# Patient Record
Sex: FEMALE | Race: OTHER
[De-identification: names, ages, dates, MRNs, and addresses within clinical notes are randomized per-mention and may not be internally consistent; named-entity substitution may affect disease eponyms.]

---

## 2017-09-05 ENCOUNTER — HOSPITAL ENCOUNTER (EMERGENCY)
Dept: HOSPITAL 15 - ER | Age: 27
LOS: 1 days | Discharge: LEFT BEFORE DISCHARGE | End: 2017-09-06
Payer: MEDICAID

## 2017-09-05 VITALS — BODY MASS INDEX: 22.08 KG/M2 | HEIGHT: 62 IN | WEIGHT: 120 LBS

## 2017-09-05 VITALS — DIASTOLIC BLOOD PRESSURE: 88 MMHG | SYSTOLIC BLOOD PRESSURE: 132 MMHG
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
## 2017-09-05 DIAGNOSIS — M25.572: Primary | ICD-10-CM
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
## 2017-09-05 DIAGNOSIS — Z53.21: ICD-10-CM

## 2017-09-05 PROCEDURE — 81025 URINE PREGNANCY TEST: CPT

## 2019-06-17 ENCOUNTER — HOSPITAL ENCOUNTER (INPATIENT)
Dept: HOSPITAL 15 - ER | Age: 29
LOS: 7 days | Discharge: HOME | DRG: 425 | End: 2019-06-24
Attending: INTERNAL MEDICINE | Admitting: INTERNAL MEDICINE
Payer: MEDICAID

## 2019-06-17 VITALS — WEIGHT: 115.3 LBS | BODY MASS INDEX: 19.21 KG/M2 | HEIGHT: 65 IN

## 2019-06-17 VITALS — SYSTOLIC BLOOD PRESSURE: 101 MMHG | DIASTOLIC BLOOD PRESSURE: 61 MMHG

## 2019-06-17 VITALS — DIASTOLIC BLOOD PRESSURE: 61 MMHG | SYSTOLIC BLOOD PRESSURE: 101 MMHG

## 2019-06-17 DIAGNOSIS — R07.89: ICD-10-CM

## 2019-06-17 DIAGNOSIS — E87.6: ICD-10-CM

## 2019-06-17 DIAGNOSIS — E89.0: ICD-10-CM

## 2019-06-17 DIAGNOSIS — Z83.3: ICD-10-CM

## 2019-06-17 DIAGNOSIS — E83.51: Primary | ICD-10-CM

## 2019-06-17 DIAGNOSIS — E11.649: ICD-10-CM

## 2019-06-17 DIAGNOSIS — Z79.899: ICD-10-CM

## 2019-06-17 DIAGNOSIS — E83.39: ICD-10-CM

## 2019-06-17 LAB
ALBUMIN SERPL-MCNC: 4 G/DL (ref 3.4–5)
ALP SERPL-CCNC: 127 U/L (ref 45–117)
ALT SERPL-CCNC: 23 U/L (ref 13–56)
ANION GAP SERPL CALCULATED.3IONS-SCNC: 14 MMOL/L (ref 5–15)
BILIRUB SERPL-MCNC: 0.5 MG/DL (ref 0.2–1)
BUN SERPL-MCNC: 12 MG/DL (ref 7–18)
BUN/CREAT SERPL: 14.5
CALCIUM SERPL-MCNC: 5.9 MG/DL (ref 8.5–10.1)
CHLORIDE SERPL-SCNC: 103 MMOL/L (ref 98–107)
CO2 SERPL-SCNC: 23 MMOL/L (ref 21–32)
GLUCOSE SERPL-MCNC: 78 MG/DL (ref 74–106)
HCT VFR BLD AUTO: 36.1 % (ref 36–46)
HGB BLD-MCNC: 11.6 G/DL (ref 12.2–16.2)
MCH RBC QN AUTO: 24.9 PG (ref 28–32)
MCV RBC AUTO: 77.7 FL (ref 80–100)
NRBC BLD QL AUTO: 0.1 %
POTASSIUM SERPL-SCNC: 3.6 MMOL/L (ref 3.5–5.1)
PROT SERPL-MCNC: 7.5 G/DL (ref 6.4–8.2)
SODIUM SERPL-SCNC: 140 MMOL/L (ref 136–145)

## 2019-06-17 PROCEDURE — 93005 ELECTROCARDIOGRAM TRACING: CPT

## 2019-06-17 PROCEDURE — 82310 ASSAY OF CALCIUM: CPT

## 2019-06-17 PROCEDURE — 96361 HYDRATE IV INFUSION ADD-ON: CPT

## 2019-06-17 PROCEDURE — 87081 CULTURE SCREEN ONLY: CPT

## 2019-06-17 PROCEDURE — 84702 CHORIONIC GONADOTROPIN TEST: CPT

## 2019-06-17 PROCEDURE — 82330 ASSAY OF CALCIUM: CPT

## 2019-06-17 PROCEDURE — 96365 THER/PROPH/DIAG IV INF INIT: CPT

## 2019-06-17 PROCEDURE — 83735 ASSAY OF MAGNESIUM: CPT

## 2019-06-17 PROCEDURE — 84484 ASSAY OF TROPONIN QUANT: CPT

## 2019-06-17 PROCEDURE — 85025 COMPLETE CBC W/AUTO DIFF WBC: CPT

## 2019-06-17 PROCEDURE — 80048 BASIC METABOLIC PNL TOTAL CA: CPT

## 2019-06-17 PROCEDURE — 82306 VITAMIN D 25 HYDROXY: CPT

## 2019-06-17 PROCEDURE — 96375 TX/PRO/DX INJ NEW DRUG ADDON: CPT

## 2019-06-17 PROCEDURE — 84439 ASSAY OF FREE THYROXINE: CPT

## 2019-06-17 PROCEDURE — 80053 COMPREHEN METABOLIC PANEL: CPT

## 2019-06-17 PROCEDURE — 84100 ASSAY OF PHOSPHORUS: CPT

## 2019-06-17 PROCEDURE — 36415 COLL VENOUS BLD VENIPUNCTURE: CPT

## 2019-06-17 PROCEDURE — 84443 ASSAY THYROID STIM HORMONE: CPT

## 2019-06-17 PROCEDURE — 83970 ASSAY OF PARATHORMONE: CPT

## 2019-06-17 PROCEDURE — 93306 TTE W/DOPPLER COMPLETE: CPT

## 2019-06-17 RX ADMIN — CALCIUM ACETATE SCH MG: 667 CAPSULE ORAL at 18:58

## 2019-06-17 RX ADMIN — CALCIUM ACETATE SCH MG: 667 CAPSULE ORAL at 22:06

## 2019-06-18 VITALS — SYSTOLIC BLOOD PRESSURE: 103 MMHG | DIASTOLIC BLOOD PRESSURE: 70 MMHG

## 2019-06-18 VITALS — SYSTOLIC BLOOD PRESSURE: 100 MMHG | DIASTOLIC BLOOD PRESSURE: 52 MMHG

## 2019-06-18 VITALS — SYSTOLIC BLOOD PRESSURE: 97 MMHG | DIASTOLIC BLOOD PRESSURE: 59 MMHG

## 2019-06-18 VITALS — DIASTOLIC BLOOD PRESSURE: 64 MMHG | SYSTOLIC BLOOD PRESSURE: 94 MMHG

## 2019-06-18 VITALS — DIASTOLIC BLOOD PRESSURE: 65 MMHG | SYSTOLIC BLOOD PRESSURE: 93 MMHG

## 2019-06-18 LAB
ANION GAP SERPL CALCULATED.3IONS-SCNC: 12 MMOL/L (ref 5–15)
ANION GAP SERPL CALCULATED.3IONS-SCNC: 12 MMOL/L (ref 5–15)
BUN SERPL-MCNC: 12 MG/DL (ref 7–18)
BUN SERPL-MCNC: 9 MG/DL (ref 7–18)
BUN/CREAT SERPL: 11.4
BUN/CREAT SERPL: 13.3
CALCIUM SERPL-MCNC: 6.1 MG/DL (ref 8.5–10.1)
CALCIUM SERPL-MCNC: 6.2 MG/DL (ref 8.5–10.1)
CHLORIDE SERPL-SCNC: 103 MMOL/L (ref 98–107)
CHLORIDE SERPL-SCNC: 104 MMOL/L (ref 98–107)
CO2 SERPL-SCNC: 24 MMOL/L (ref 21–32)
CO2 SERPL-SCNC: 26 MMOL/L (ref 21–32)
GLUCOSE SERPL-MCNC: 87 MG/DL (ref 74–106)
GLUCOSE SERPL-MCNC: 90 MG/DL (ref 74–106)
POTASSIUM SERPL-SCNC: 3.6 MMOL/L (ref 3.5–5.1)
POTASSIUM SERPL-SCNC: 3.6 MMOL/L (ref 3.5–5.1)
SODIUM SERPL-SCNC: 140 MMOL/L (ref 136–145)
SODIUM SERPL-SCNC: 141 MMOL/L (ref 136–145)

## 2019-06-18 RX ADMIN — HYDROCODONE BITARTRATE AND ACETAMINOPHEN PRN TAB: 5; 325 TABLET ORAL at 12:12

## 2019-06-18 RX ADMIN — CALCIUM ACETATE SCH MG: 667 CAPSULE ORAL at 09:54

## 2019-06-18 RX ADMIN — Medication SCH MG: at 22:06

## 2019-06-18 RX ADMIN — Medication SCH MG: at 16:02

## 2019-06-18 RX ADMIN — NITROGLYCERIN PRN MG: 0.4 TABLET SUBLINGUAL at 22:05

## 2019-06-18 RX ADMIN — LEVOTHYROXINE SODIUM SCH MCG: 50 TABLET ORAL at 05:50

## 2019-06-18 RX ADMIN — ONDANSETRON HYDROCHLORIDE PRN MG: 2 INJECTION, SOLUTION INTRAMUSCULAR; INTRAVENOUS at 22:30

## 2019-06-18 RX ADMIN — MAGNESIUM OXIDE TAB 400 MG (241.3 MG ELEMENTAL MG) SCH MG: 400 (241.3 MG) TAB at 09:53

## 2019-06-18 RX ADMIN — NITROGLYCERIN PRN MG: 0.4 TABLET SUBLINGUAL at 22:15

## 2019-06-18 RX ADMIN — NITROGLYCERIN PRN MG: 0.4 TABLET SUBLINGUAL at 21:55

## 2019-06-18 NOTE — NUR
Hospitalist Wally called back and updated on patient's status. Relayed result of Calcium, 
received new order to give 2 gm Calcium Gluconate IV x1, acknowledged and read back, will 
carry out order

## 2019-06-18 NOTE — NUR
Morning note



patient resting in bed with eyes closed, respirations even and unlabored, no distress noted. 
Fall precautions in place with call light within reach. Will continue to monitor q1hr & PRN.

## 2019-06-18 NOTE — NUR
Spoke with hospitalist - Dr. Burr



updated MD. MD verbalized understanding. Orders received and read back to verify.

## 2019-06-18 NOTE — NUR
Spoke to Dr. Burr and updated on patient's status. Received new order at this time, read 
back and acknowledged, will carry out order

## 2019-06-18 NOTE — NUR
Telemetry admit from [facility] admitted to Telemetry unit after SBAR received.  Patient oriented to Jory Reyes RN primary RN, unit, room, bed, and unit policies regarding patient care and 
visiting hours. Patient now on continuous telemetry monitoring, tele box # 30 and telemetry 
reading on arrival to unit is SR. Patient weighed by bedscale and encouraged to call if they 
need something. All questions and concerns addressed, patient verbalized understanding, will 
continue to monitor 

Note: []

## 2019-06-18 NOTE — NUR
Patient complained of chest pain and nausea and continuous tingling sensation on both arms. 
V/S taken and EKG done. Paged Dr. Burr, awaiting call back

## 2019-06-18 NOTE — NUR
closing note



Patient resting in bed with even and unlabored respirations, no distress noted. Visitor at 
bedside.

## 2019-06-18 NOTE — NUR
MD was at bedside - Dr. Ramana REYES to place orders. Patient to follow up with MD outpatient. Patient aware.

## 2019-06-18 NOTE — NUR
Patient is complaining of tingling sensation on both arms. Patient and family are 
complaining about the Calcium dose, and that she's not getting the same dose of 8000mg a 
day. Will refer to MD

## 2019-06-18 NOTE — NUR
Patient complained of tingling sensation that is coming back again on her right arm. Paged 
hospitalist by CLIFF Schroeder, awaiting call back

## 2019-06-18 NOTE — NUR
Spoke to Dr. Burr and updated on patient's status and latest lab results. Received new 
order to give 1 bag of Calcium Gluconate x1 at this time, acknowledged and read back, will 
continue care

## 2019-06-19 VITALS — SYSTOLIC BLOOD PRESSURE: 113 MMHG | DIASTOLIC BLOOD PRESSURE: 65 MMHG

## 2019-06-19 VITALS — DIASTOLIC BLOOD PRESSURE: 69 MMHG | SYSTOLIC BLOOD PRESSURE: 103 MMHG

## 2019-06-19 VITALS — SYSTOLIC BLOOD PRESSURE: 98 MMHG | DIASTOLIC BLOOD PRESSURE: 61 MMHG

## 2019-06-19 VITALS — DIASTOLIC BLOOD PRESSURE: 81 MMHG | SYSTOLIC BLOOD PRESSURE: 106 MMHG

## 2019-06-19 VITALS — SYSTOLIC BLOOD PRESSURE: 97 MMHG | DIASTOLIC BLOOD PRESSURE: 66 MMHG

## 2019-06-19 VITALS — SYSTOLIC BLOOD PRESSURE: 106 MMHG | DIASTOLIC BLOOD PRESSURE: 81 MMHG

## 2019-06-19 VITALS — DIASTOLIC BLOOD PRESSURE: 68 MMHG | SYSTOLIC BLOOD PRESSURE: 94 MMHG

## 2019-06-19 VITALS — DIASTOLIC BLOOD PRESSURE: 68 MMHG | SYSTOLIC BLOOD PRESSURE: 108 MMHG

## 2019-06-19 VITALS — SYSTOLIC BLOOD PRESSURE: 112 MMHG | DIASTOLIC BLOOD PRESSURE: 66 MMHG

## 2019-06-19 LAB
ALBUMIN SERPL-MCNC: 3.5 G/DL (ref 3.4–5)
ALP SERPL-CCNC: 113 U/L (ref 45–117)
ALT SERPL-CCNC: 20 U/L (ref 13–56)
ANION GAP SERPL CALCULATED.3IONS-SCNC: 9 MMOL/L (ref 5–15)
BILIRUB SERPL-MCNC: 0.3 MG/DL (ref 0.2–1)
BUN SERPL-MCNC: 15 MG/DL (ref 7–18)
BUN/CREAT SERPL: 17.9
CALCIUM SERPL-MCNC: 6.1 MG/DL (ref 8.5–10.1)
CHLORIDE SERPL-SCNC: 105 MMOL/L (ref 98–107)
CO2 SERPL-SCNC: 26 MMOL/L (ref 21–32)
GLUCOSE SERPL-MCNC: 84 MG/DL (ref 74–106)
MAGNESIUM SERPL-MCNC: 2 MG/DL (ref 1.6–2.6)
POTASSIUM SERPL-SCNC: 3.7 MMOL/L (ref 3.5–5.1)
PROT SERPL-MCNC: 7.1 G/DL (ref 6.4–8.2)
SODIUM SERPL-SCNC: 140 MMOL/L (ref 136–145)

## 2019-06-19 RX ADMIN — Medication SCH MG: at 16:05

## 2019-06-19 RX ADMIN — NITROGLYCERIN PRN MG: 0.4 TABLET SUBLINGUAL at 00:55

## 2019-06-19 RX ADMIN — CALCIUM GLUCONATE SCH MLS/HR: 94 INJECTION, SOLUTION INTRAVENOUS at 21:00

## 2019-06-19 RX ADMIN — CALCIUM GLUCONATE SCH MLS/HR: 94 INJECTION, SOLUTION INTRAVENOUS at 22:48

## 2019-06-19 RX ADMIN — MAGNESIUM OXIDE TAB 400 MG (241.3 MG ELEMENTAL MG) SCH MG: 400 (241.3 MG) TAB at 10:44

## 2019-06-19 RX ADMIN — CALCITRIOL SCH MCG: 0.25 CAPSULE, LIQUID FILLED ORAL at 22:17

## 2019-06-19 RX ADMIN — Medication SCH MG: at 19:42

## 2019-06-19 RX ADMIN — ONDANSETRON HYDROCHLORIDE PRN MG: 2 INJECTION, SOLUTION INTRAMUSCULAR; INTRAVENOUS at 16:33

## 2019-06-19 RX ADMIN — CALCIUM GLUCONATE SCH MLS/HR: 94 INJECTION, SOLUTION INTRAVENOUS at 19:43

## 2019-06-19 RX ADMIN — LEVOTHYROXINE SODIUM SCH MCG: 50 TABLET ORAL at 06:13

## 2019-06-19 NOTE — NUR
Received orders from DR. Boles for repeat calcium glucanate x2. Will place orders, 
administer and continue to monitor.

## 2019-06-19 NOTE — NUR
RECEIVED PATIENT

Received patient into room 103 patient transferred to ICU bed and connected to bedside 
monitor. Patient alert and oriented X 4, speech appropriate, with right hand clenched and 
unable to squeeze hand. Left hand  strong. Sinus rhythm in the 70's on bedside monitor, 
pulses palpable on upper/lower extremities with no edema observed. Patient on room air 
sating in the high 90's. Abdomen soft, nontender, and non distended with bowel sounds 
present. IV to the right forearm 22g: good blood return and flushes easily infusing Calcium 
gluconate per MD orders. Skin intact. Bed at lowest position and call light within reach. 
Will continue to monitor patient closely.

## 2019-06-19 NOTE — NUR
NUTRITION

PT STATES SHE HAS HAD VERY POOR APPETITE PAST COUPLE OF DAYS. PT  BROUGHT FOOD 
PREFERENCE TO PT. PT CONSUMED 75% OF MEAL WITH NO COMPLAINTS OF NAUSEA.

## 2019-06-19 NOTE — NUR
ACTIVITY

PT AMBULATED WITH ASSIST TO TOILET. PT STATES ABLE TO URINATE WITH NO DIFFICULTY. PT 
ASSISTED BACK TO BED. RECONNECTED TO MONITORING. CALL BELL IN REACH. PT  AT BEDSIDE.

## 2019-06-19 NOTE — NUR
Charge, Maki CORONADO, notified of the patient's recurring tetany. Maki to call Dr. Burr 
for orders for possible transfer to ICU for calcium drip

## 2019-06-19 NOTE — NUR
OPEN

ASSUMED CARE OF FEMALE PT A&O X 4. PT SB MID 50'S TO SR 60'S ON CARDIAC MONITOR. PT ON ROOM 
AIR SATS 97%. PT NOW HAS FULL ROM AND GOOD HAND  ROBERTA. DENIES ANY NUMBNESS OR TINGLING. 
PT CURRENTLY RECEIVING CA GLUCONATE IVP INFUSING INTO 22 G IV TO R. F/A B&P. PT AMBULATES TO 
BATHROOM WITH STANDBY ASSIST. PT DENIES PAIN. BED IN LOWEST LOCKED POSITION. SIDE RAILS UP X 
2. PT EDUCATED TO USE CALL BELL PRIOR TO AMBULATION. WILL CONTINUE TO MONITOR.

## 2019-06-20 VITALS — DIASTOLIC BLOOD PRESSURE: 51 MMHG | SYSTOLIC BLOOD PRESSURE: 84 MMHG

## 2019-06-20 VITALS — SYSTOLIC BLOOD PRESSURE: 94 MMHG | DIASTOLIC BLOOD PRESSURE: 56 MMHG

## 2019-06-20 VITALS — SYSTOLIC BLOOD PRESSURE: 87 MMHG | DIASTOLIC BLOOD PRESSURE: 53 MMHG

## 2019-06-20 VITALS — SYSTOLIC BLOOD PRESSURE: 80 MMHG | DIASTOLIC BLOOD PRESSURE: 44 MMHG

## 2019-06-20 VITALS — DIASTOLIC BLOOD PRESSURE: 61 MMHG | SYSTOLIC BLOOD PRESSURE: 96 MMHG

## 2019-06-20 VITALS — SYSTOLIC BLOOD PRESSURE: 99 MMHG | DIASTOLIC BLOOD PRESSURE: 53 MMHG

## 2019-06-20 VITALS — DIASTOLIC BLOOD PRESSURE: 62 MMHG | SYSTOLIC BLOOD PRESSURE: 98 MMHG

## 2019-06-20 VITALS — DIASTOLIC BLOOD PRESSURE: 59 MMHG | SYSTOLIC BLOOD PRESSURE: 104 MMHG

## 2019-06-20 VITALS — SYSTOLIC BLOOD PRESSURE: 111 MMHG | DIASTOLIC BLOOD PRESSURE: 60 MMHG

## 2019-06-20 VITALS — SYSTOLIC BLOOD PRESSURE: 92 MMHG | DIASTOLIC BLOOD PRESSURE: 65 MMHG

## 2019-06-20 VITALS — DIASTOLIC BLOOD PRESSURE: 44 MMHG | SYSTOLIC BLOOD PRESSURE: 99 MMHG

## 2019-06-20 VITALS — SYSTOLIC BLOOD PRESSURE: 97 MMHG | DIASTOLIC BLOOD PRESSURE: 51 MMHG

## 2019-06-20 VITALS — SYSTOLIC BLOOD PRESSURE: 82 MMHG | DIASTOLIC BLOOD PRESSURE: 45 MMHG

## 2019-06-20 VITALS — DIASTOLIC BLOOD PRESSURE: 65 MMHG | SYSTOLIC BLOOD PRESSURE: 108 MMHG

## 2019-06-20 VITALS — SYSTOLIC BLOOD PRESSURE: 92 MMHG | DIASTOLIC BLOOD PRESSURE: 48 MMHG

## 2019-06-20 VITALS — SYSTOLIC BLOOD PRESSURE: 107 MMHG | DIASTOLIC BLOOD PRESSURE: 53 MMHG

## 2019-06-20 VITALS — SYSTOLIC BLOOD PRESSURE: 110 MMHG | DIASTOLIC BLOOD PRESSURE: 70 MMHG

## 2019-06-20 VITALS — DIASTOLIC BLOOD PRESSURE: 71 MMHG | SYSTOLIC BLOOD PRESSURE: 118 MMHG

## 2019-06-20 VITALS — DIASTOLIC BLOOD PRESSURE: 61 MMHG | SYSTOLIC BLOOD PRESSURE: 105 MMHG

## 2019-06-20 VITALS — DIASTOLIC BLOOD PRESSURE: 61 MMHG | SYSTOLIC BLOOD PRESSURE: 93 MMHG

## 2019-06-20 VITALS — SYSTOLIC BLOOD PRESSURE: 104 MMHG | DIASTOLIC BLOOD PRESSURE: 71 MMHG

## 2019-06-20 VITALS — SYSTOLIC BLOOD PRESSURE: 103 MMHG | DIASTOLIC BLOOD PRESSURE: 66 MMHG

## 2019-06-20 VITALS — DIASTOLIC BLOOD PRESSURE: 71 MMHG | SYSTOLIC BLOOD PRESSURE: 101 MMHG

## 2019-06-20 LAB
ALBUMIN SERPL-MCNC: 3.6 G/DL (ref 3.4–5)
ALBUMIN SERPL-MCNC: 3.7 G/DL (ref 3.4–5)
ALP SERPL-CCNC: 122 U/L (ref 45–117)
ALP SERPL-CCNC: 124 U/L (ref 45–117)
ALT SERPL-CCNC: 20 U/L (ref 13–56)
ALT SERPL-CCNC: 21 U/L (ref 13–56)
ANION GAP SERPL CALCULATED.3IONS-SCNC: 8 MMOL/L (ref 5–15)
ANION GAP SERPL CALCULATED.3IONS-SCNC: 8 MMOL/L (ref 5–15)
ANION GAP SERPL CALCULATED.3IONS-SCNC: 9 MMOL/L (ref 5–15)
BILIRUB SERPL-MCNC: 0.4 MG/DL (ref 0.2–1)
BILIRUB SERPL-MCNC: 0.5 MG/DL (ref 0.2–1)
BUN SERPL-MCNC: 10 MG/DL (ref 7–18)
BUN SERPL-MCNC: 11 MG/DL (ref 7–18)
BUN SERPL-MCNC: 11 MG/DL (ref 7–18)
BUN/CREAT SERPL: 12.1
BUN/CREAT SERPL: 12.3
BUN/CREAT SERPL: 12.9
CALCIUM SERPL-MCNC: 6.9 MG/DL (ref 8.5–10.1)
CALCIUM SERPL-MCNC: 7.2 MG/DL (ref 8.5–10.1)
CALCIUM SERPL-MCNC: 8.6 MG/DL (ref 8.5–10.1)
CHLORIDE SERPL-SCNC: 101 MMOL/L (ref 98–107)
CHLORIDE SERPL-SCNC: 102 MMOL/L (ref 98–107)
CHLORIDE SERPL-SCNC: 103 MMOL/L (ref 98–107)
CO2 SERPL-SCNC: 26 MMOL/L (ref 21–32)
CO2 SERPL-SCNC: 27 MMOL/L (ref 21–32)
CO2 SERPL-SCNC: 29 MMOL/L (ref 21–32)
GLUCOSE SERPL-MCNC: 105 MG/DL (ref 74–106)
GLUCOSE SERPL-MCNC: 91 MG/DL (ref 74–106)
GLUCOSE SERPL-MCNC: 93 MG/DL (ref 74–106)
POTASSIUM SERPL-SCNC: 3.7 MMOL/L (ref 3.5–5.1)
POTASSIUM SERPL-SCNC: 3.7 MMOL/L (ref 3.5–5.1)
POTASSIUM SERPL-SCNC: 4.1 MMOL/L (ref 3.5–5.1)
PROT SERPL-MCNC: 7.3 G/DL (ref 6.4–8.2)
PROT SERPL-MCNC: 7.4 G/DL (ref 6.4–8.2)
SODIUM SERPL-SCNC: 136 MMOL/L (ref 136–145)
SODIUM SERPL-SCNC: 138 MMOL/L (ref 136–145)
SODIUM SERPL-SCNC: 139 MMOL/L (ref 136–145)

## 2019-06-20 RX ADMIN — HYDROCODONE BITARTRATE AND ACETAMINOPHEN PRN TAB: 5; 325 TABLET ORAL at 20:01

## 2019-06-20 RX ADMIN — CALCITRIOL SCH MCG: 0.25 CAPSULE, LIQUID FILLED ORAL at 10:18

## 2019-06-20 RX ADMIN — MAGNESIUM OXIDE TAB 400 MG (241.3 MG ELEMENTAL MG) SCH MG: 400 (241.3 MG) TAB at 10:18

## 2019-06-20 RX ADMIN — CALCITRIOL SCH MCG: 0.25 CAPSULE, LIQUID FILLED ORAL at 21:52

## 2019-06-20 RX ADMIN — LEVOTHYROXINE SODIUM SCH MCG: 50 TABLET ORAL at 05:54

## 2019-06-20 RX ADMIN — CALCIUM ACETATE SCH MG: 667 CAPSULE ORAL at 18:08

## 2019-06-20 RX ADMIN — Medication SCH MG: at 08:25

## 2019-06-20 RX ADMIN — CALCIUM GLUCONATE SCH MLS/HR: 94 INJECTION, SOLUTION INTRAVENOUS at 00:35

## 2019-06-20 NOTE — NUR
MD Dr. Boles at bedside updated on patient condition with no new orders received. MD spoke to 
patient and patient family, whom are at bedside, questions/concerns answered.

## 2019-06-20 NOTE — NUR
MD Dr. Burr at bedside updated on patient condition with no new orders received. MD spoke to 
patient regarding plan of care and questions/concerns answered by MD.Will continue to 
monitor patient at this time.

## 2019-06-20 NOTE — NUR
Patient bathe/linen change

Patient assisted with sponge/basin bath. Skin integrity assessed for any changes, no new 
changes. Mouth care done by self, slightly gun bleed after brushing, Pt stated that she 
brushed slightly hard. Partial linens changed. Patient ambulated to the toilet near the bed 
by self, w/o incident. Continue care.

## 2019-06-20 NOTE — NUR
DR EAGLE CALL

DR EAGLE UPDATED ON CURRENT CA+ LEVEL, AND PHOSPHORUS LEVEL. UPDATED REGARDING PT CONDITION 
THROUGH THE NIGHT. ORDERS RECEIVED/READ BACK AND VERIFIED.

## 2019-06-20 NOTE — NUR
IV REMOVAL/IV START

PT IV TO R. F.A. TENDER PINK TO AREA. NS 10ML/HR WAS INFUSING. D/C'D CATH INTACT. NEW IV 
START TO L. F/A 22 G AFTER 1 ATTEMPT ESTABLISHED. PT TOLERATED WELL.

## 2019-06-20 NOTE — NUR
MD

Spoke to Dr. Boles  aware of Calcium level of 7.2 received new orders, this RN to input 
into system. Will carry out orders.

## 2019-06-20 NOTE — NUR
MD Dr. Boles paged at [phone] spoke with Radha and states " Will page doctor." 
Awaiting for MD to call back.

## 2019-06-20 NOTE — NUR
MD Dr. Boles paged for a second time at [phone] spoke with Judith and states " Will get a 
hold of doctor." Awaiting for MD to call back.

## 2019-06-21 VITALS — SYSTOLIC BLOOD PRESSURE: 91 MMHG | DIASTOLIC BLOOD PRESSURE: 47 MMHG

## 2019-06-21 VITALS — SYSTOLIC BLOOD PRESSURE: 135 MMHG | DIASTOLIC BLOOD PRESSURE: 95 MMHG

## 2019-06-21 VITALS — SYSTOLIC BLOOD PRESSURE: 98 MMHG | DIASTOLIC BLOOD PRESSURE: 49 MMHG

## 2019-06-21 VITALS — SYSTOLIC BLOOD PRESSURE: 93 MMHG | DIASTOLIC BLOOD PRESSURE: 59 MMHG

## 2019-06-21 VITALS — SYSTOLIC BLOOD PRESSURE: 114 MMHG | DIASTOLIC BLOOD PRESSURE: 67 MMHG

## 2019-06-21 VITALS — SYSTOLIC BLOOD PRESSURE: 105 MMHG | DIASTOLIC BLOOD PRESSURE: 57 MMHG

## 2019-06-21 VITALS — DIASTOLIC BLOOD PRESSURE: 57 MMHG | SYSTOLIC BLOOD PRESSURE: 105 MMHG

## 2019-06-21 VITALS — DIASTOLIC BLOOD PRESSURE: 56 MMHG | SYSTOLIC BLOOD PRESSURE: 94 MMHG

## 2019-06-21 VITALS — SYSTOLIC BLOOD PRESSURE: 115 MMHG | DIASTOLIC BLOOD PRESSURE: 58 MMHG

## 2019-06-21 VITALS — SYSTOLIC BLOOD PRESSURE: 108 MMHG | DIASTOLIC BLOOD PRESSURE: 64 MMHG

## 2019-06-21 VITALS — DIASTOLIC BLOOD PRESSURE: 52 MMHG | SYSTOLIC BLOOD PRESSURE: 97 MMHG

## 2019-06-21 VITALS — DIASTOLIC BLOOD PRESSURE: 95 MMHG | SYSTOLIC BLOOD PRESSURE: 135 MMHG

## 2019-06-21 VITALS — SYSTOLIC BLOOD PRESSURE: 95 MMHG | DIASTOLIC BLOOD PRESSURE: 61 MMHG

## 2019-06-21 VITALS — DIASTOLIC BLOOD PRESSURE: 53 MMHG | SYSTOLIC BLOOD PRESSURE: 124 MMHG

## 2019-06-21 VITALS — SYSTOLIC BLOOD PRESSURE: 105 MMHG | DIASTOLIC BLOOD PRESSURE: 77 MMHG

## 2019-06-21 VITALS — SYSTOLIC BLOOD PRESSURE: 105 MMHG | DIASTOLIC BLOOD PRESSURE: 66 MMHG

## 2019-06-21 VITALS — SYSTOLIC BLOOD PRESSURE: 99 MMHG | DIASTOLIC BLOOD PRESSURE: 47 MMHG

## 2019-06-21 VITALS — DIASTOLIC BLOOD PRESSURE: 61 MMHG | SYSTOLIC BLOOD PRESSURE: 97 MMHG

## 2019-06-21 VITALS — SYSTOLIC BLOOD PRESSURE: 108 MMHG | DIASTOLIC BLOOD PRESSURE: 67 MMHG

## 2019-06-21 VITALS — SYSTOLIC BLOOD PRESSURE: 96 MMHG | DIASTOLIC BLOOD PRESSURE: 56 MMHG

## 2019-06-21 VITALS — DIASTOLIC BLOOD PRESSURE: 61 MMHG | SYSTOLIC BLOOD PRESSURE: 94 MMHG

## 2019-06-21 VITALS — DIASTOLIC BLOOD PRESSURE: 62 MMHG | SYSTOLIC BLOOD PRESSURE: 94 MMHG

## 2019-06-21 VITALS — DIASTOLIC BLOOD PRESSURE: 58 MMHG | SYSTOLIC BLOOD PRESSURE: 96 MMHG

## 2019-06-21 VITALS — DIASTOLIC BLOOD PRESSURE: 67 MMHG | SYSTOLIC BLOOD PRESSURE: 108 MMHG

## 2019-06-21 VITALS — DIASTOLIC BLOOD PRESSURE: 53 MMHG | SYSTOLIC BLOOD PRESSURE: 92 MMHG

## 2019-06-21 LAB
ALBUMIN SERPL-MCNC: 3.6 G/DL (ref 3.4–5)
ALP SERPL-CCNC: 117 U/L (ref 45–117)
ALT SERPL-CCNC: 18 U/L (ref 13–56)
ANION GAP SERPL CALCULATED.3IONS-SCNC: 7 MMOL/L (ref 5–15)
BILIRUB SERPL-MCNC: 0.3 MG/DL (ref 0.2–1)
BUN SERPL-MCNC: 14 MG/DL (ref 7–18)
BUN/CREAT SERPL: 17.7
CALCIUM SERPL-MCNC: 6.9 MG/DL (ref 8.5–10.1)
CHLORIDE SERPL-SCNC: 105 MMOL/L (ref 98–107)
CO2 SERPL-SCNC: 27 MMOL/L (ref 21–32)
GLUCOSE SERPL-MCNC: 92 MG/DL (ref 74–106)
HCT VFR BLD AUTO: 33.8 % (ref 36–46)
HGB BLD-MCNC: 11.4 G/DL (ref 12.2–16.2)
MAGNESIUM SERPL-MCNC: 2 MG/DL (ref 1.6–2.6)
MCH RBC QN AUTO: 26.3 PG (ref 28–32)
MCV RBC AUTO: 77.8 FL (ref 80–100)
NRBC BLD QL AUTO: 0.4 %
POTASSIUM SERPL-SCNC: 3.8 MMOL/L (ref 3.5–5.1)
PROT SERPL-MCNC: 7 G/DL (ref 6.4–8.2)
SODIUM SERPL-SCNC: 139 MMOL/L (ref 136–145)

## 2019-06-21 RX ADMIN — LEVOTHYROXINE SODIUM SCH MCG: 50 TABLET ORAL at 06:19

## 2019-06-21 RX ADMIN — CALCIUM ACETATE SCH MG: 667 CAPSULE ORAL at 12:10

## 2019-06-21 RX ADMIN — CALCIUM ACETATE SCH MG: 667 CAPSULE ORAL at 18:26

## 2019-06-21 RX ADMIN — CALCIUM GLUCONATE SCH MLS/HR: 94 INJECTION, SOLUTION INTRAVENOUS at 12:49

## 2019-06-21 RX ADMIN — CALCIUM GLUCONATE SCH MLS/HR: 94 INJECTION, SOLUTION INTRAVENOUS at 10:20

## 2019-06-21 RX ADMIN — CALCITRIOL SCH MCG: 0.25 CAPSULE, LIQUID FILLED ORAL at 22:16

## 2019-06-21 RX ADMIN — CALCIUM GLUCONATE SCH MLS/HR: 94 INJECTION, SOLUTION INTRAVENOUS at 08:43

## 2019-06-21 RX ADMIN — CALCIUM GLUCONATE SCH MLS/HR: 94 INJECTION, SOLUTION INTRAVENOUS at 07:02

## 2019-06-21 RX ADMIN — MAGNESIUM OXIDE TAB 400 MG (241.3 MG ELEMENTAL MG) SCH MG: 400 (241.3 MG) TAB at 10:20

## 2019-06-21 RX ADMIN — CALCIUM ACETATE SCH MG: 667 CAPSULE ORAL at 08:43

## 2019-06-21 NOTE — NUR
gave report to karin denise

-------------------------------------------------------------------------------

Addendum: 06/22/19 at 0025 by RODO SALDIVAR RN RN

-------------------------------------------------------------------------------

report given to Yolis GUIDO RN

## 2019-06-21 NOTE — NUR
RN ALREADY AT BEDSIDE ASSESSING IV. DR MCCRARY AT BEDSIDE, PATIENT REQUESTING THAT HER 
OUTPATIENT ENDOCRINOLOGIST STATED SHE NEEDS TO BE ON CONTINUOUS CALCIUM GTT. PER DR MCCRARY 
RN TO CALL DR SALOMON TO VERIFY CONTINUOUS CALCIUM GTT. PER DR MCCRARY IF NO CONTINUOUS 
CALCIUM GTT OK TO TRANSFER TO [facility] IF NO CONTINUOUS CALCIUM GTT ORDER. DR MCCRARY EXAMINED 
PATIENT. DR WEINER LEFT FA 22G IV SHOWING SING

-------------------------------------------------------------------------------

Addendum: 06/21/19 at 1239 by Charles Márquez RN

-------------------------------------------------------------------------------

SIGNS OF INFILTRATION, IV IMMEDIATELY STOPPED BY RN AND IV DISCONTINUED WITH CATHETER INTACT 
AND ELEVATED ON PILLOW.

## 2019-06-21 NOTE — NUR
Hypocalcemia 



Ca6.9, PO4 6.9 from AM LABs. Paged and left message to Nephrologist on call.  



06.30am Dr. Dale called back. Ordered 

1. to give 4gm of calcium gluconate IV x1 

2. to increase Calcitrol 3 mcg po bid 

3. Repeat Ca level in pm

TORB and verified correct.

## 2019-06-21 NOTE — NUR
ASSESSMENT COMPLETED, SEE INTERVENTIONS. A/O X4. DENIES ANY PAIN OR SYMPTOMS OF RESPIRATORY 
DISTRESS WITH NO SIGNS ASSESSED. C/O OF SLIGHT TINGLING IN LEFT HAND/FINGERS. FIRST BAG OF 
CALCIUM IVBP FROM RECENT ORDER INFUSING. HR SR. BED IN LOW POSITION, CALL LIGHT IN REACH.

## 2019-06-21 NOTE — NUR
PARENTS AT BEDSIDE, DIET TEACHING PROVIDED REGARDING LOW PHOSPHOROUS DIET AND DIETICIAN 
CONSULT PLACED PER PROTOCOL AND PER PATIENT REQUEST.

## 2019-06-21 NOTE — NUR
LEFT FA IV POSSIBLE INFILTRATION SITE STILL RED/SWOLLEN BUT LESS SWOLLEN AS NOTED EARLIER, 
C/O TOLERABLE PAIN AT SITE. PATIENT CONTINUES TO PUT ICE PACK ON SITE FOR 20 MIN Q1H.

## 2019-06-21 NOTE — NUR
SPOKE WITH DR PALOMO OVER PHONE,  IS PATIENT'S OUTPATIENT ENDOCRINOLOGIST, PER PATIENT 
REQUEST OK TO GIVE DR INFORMATION REGARDING MEDICATIONS ORDERED/CALCIUM LEVEL.  REASSURED 
PATIENT "THAT EVERYTHING IS GOING AS SHOULD".

## 2019-06-21 NOTE — NUR
PATIENT DID NOT WANT CONTINUOUS CALCIUM GTT PER HER REQUEST EARLIER (SEE PREVIOUS NOTE) DR SALOMON NOT PAGED AND [facility] ORDER PLACED PER DR MCCRARY ORDER, PATIENT AWARE WE WILL RECHECK 
CALCIUM LEVEL AT 1800. HEAT PACK APPLIED TO LEFT FA POSSIBLE  INFILTRATION SITE THAT IS 
ABOUT A GOLF BALL SIZED RED/SWOLLEN AREA. PHARMACIST WAS NOTIFIED AND CHECKING TO SEE IF ANY 
MEDICATION CAN BE PLACED ON SITE AND WILL CALL BACK. 

-------------------------------------------------------------------------------

Addendum: 06/21/19 at 1255 by Charles Márquez RN

-------------------------------------------------------------------------------

HEAT PACK NOT APPLIED YET ON BEDSIDE TABLE, PHARMACY STATED TO PLACE ICE PACK INSTEAD.

## 2019-06-21 NOTE — NUR
open note



received report from day rn, assumed care of female pt, sitting in bed talking on cell 
phone. pt connected to icu monitors, vs wnl, on room air, respirations equal and unlabored. 
pt has iv l ac 22g . iv flushes it is patent, dressing is dry  and clean, no ss of redness 
or swelling at this time.  no indication of pain observed, no ss of distress at this time. 
pt is able to ambulate. hob 30*, bed is in lowest position, wheels locked,2 side rails up.  
pt educated on use of call light and to use it when need of assistance of anything, pt 
verbalized understanding. pt is in full view of nursing station, will continue to care for 
and monitor.

## 2019-06-21 NOTE — NUR
NUTRITION CONSULT/ASSESSMENT NOTES



Please refer to link notes of nutrition screen form filed under the intervention section of 
the plan of care for further details.



Est. Needs: 1550 kcal to 1800 kcal (30-35 kcal/kgBW), 52 gms to 62 gms pro (1.0-1.2 
gms/kgBW). Will continue to monitor pertinent labs and reassess nutrient need prn



Thank you for this consult.


-------------------------------------------------------------------------------

Addendum: 06/21/19 at 1447 by Gaby Arevalo RD

-------------------------------------------------------------------------------

Amended: Links added.

## 2019-06-22 VITALS — SYSTOLIC BLOOD PRESSURE: 94 MMHG | DIASTOLIC BLOOD PRESSURE: 62 MMHG

## 2019-06-22 VITALS — DIASTOLIC BLOOD PRESSURE: 61 MMHG | SYSTOLIC BLOOD PRESSURE: 93 MMHG

## 2019-06-22 VITALS — DIASTOLIC BLOOD PRESSURE: 62 MMHG | SYSTOLIC BLOOD PRESSURE: 101 MMHG

## 2019-06-22 VITALS — SYSTOLIC BLOOD PRESSURE: 97 MMHG | DIASTOLIC BLOOD PRESSURE: 61 MMHG

## 2019-06-22 LAB
ANION GAP SERPL CALCULATED.3IONS-SCNC: 7 MMOL/L (ref 5–15)
ANION GAP SERPL CALCULATED.3IONS-SCNC: 9 MMOL/L (ref 5–15)
BUN SERPL-MCNC: 13 MG/DL (ref 7–18)
BUN SERPL-MCNC: 14 MG/DL (ref 7–18)
BUN/CREAT SERPL: 11.4
BUN/CREAT SERPL: 17.3
CALCIUM SERPL-MCNC: 6.4 MG/DL (ref 8.5–10.1)
CALCIUM SERPL-MCNC: 8.9 MG/DL (ref 8.5–10.1)
CHLORIDE SERPL-SCNC: 105 MMOL/L (ref 98–107)
CHLORIDE SERPL-SCNC: 105 MMOL/L (ref 98–107)
CO2 SERPL-SCNC: 26 MMOL/L (ref 21–32)
CO2 SERPL-SCNC: 28 MMOL/L (ref 21–32)
GLUCOSE SERPL-MCNC: 88 MG/DL (ref 74–106)
GLUCOSE SERPL-MCNC: 89 MG/DL (ref 74–106)
POTASSIUM SERPL-SCNC: 3.8 MMOL/L (ref 3.5–5.1)
POTASSIUM SERPL-SCNC: 4.1 MMOL/L (ref 3.5–5.1)
SODIUM SERPL-SCNC: 140 MMOL/L (ref 136–145)
SODIUM SERPL-SCNC: 140 MMOL/L (ref 136–145)

## 2019-06-22 RX ADMIN — Medication SCH MG: at 18:34

## 2019-06-22 RX ADMIN — CALCIUM GLUCONATE SCH MLS/HR: 94 INJECTION, SOLUTION INTRAVENOUS at 14:09

## 2019-06-22 RX ADMIN — CALCIUM GLUCONATE SCH MLS/HR: 94 INJECTION, SOLUTION INTRAVENOUS at 16:28

## 2019-06-22 RX ADMIN — CALCIUM ACETATE SCH MG: 667 CAPSULE ORAL at 08:00

## 2019-06-22 RX ADMIN — CALCITRIOL SCH MCG: 0.25 CAPSULE, LIQUID FILLED ORAL at 10:23

## 2019-06-22 RX ADMIN — MAGNESIUM OXIDE TAB 400 MG (241.3 MG ELEMENTAL MG) SCH MG: 400 (241.3 MG) TAB at 10:23

## 2019-06-22 RX ADMIN — CALCITRIOL SCH MCG: 0.25 CAPSULE, LIQUID FILLED ORAL at 23:01

## 2019-06-22 RX ADMIN — CALCIUM GLUCONATE SCH MLS/HR: 94 INJECTION, SOLUTION INTRAVENOUS at 15:36

## 2019-06-22 RX ADMIN — CALCIUM GLUCONATE SCH MLS/HR: 94 INJECTION, SOLUTION INTRAVENOUS at 12:21

## 2019-06-22 RX ADMIN — Medication SCH MG: at 22:00

## 2019-06-22 RX ADMIN — LEVOTHYROXINE SODIUM SCH MCG: 50 TABLET ORAL at 06:34

## 2019-06-22 RX ADMIN — CALCIUM ACETATE SCH MG: 667 CAPSULE ORAL at 13:04

## 2019-06-22 NOTE — NUR
Pt has remained stable since transfer to [facility].  Denies symptoms of any cramping.  States 
forearm feels better from infiltration and there is currently no redness or swelling.  
Report given to AM shift, care endorsed.

## 2019-06-22 NOTE — NUR
RECEIVED REPORT

PATIENT RESTING IN BED, DENIES PAIN OF CRAMPING AT THIS TIME. STABLE VITALS. WILL CONTINUE 
TO MONITOR CLOSELY

## 2019-06-22 NOTE — NUR
INCENTIVE SPIROMETER

PATIENT'S LUNG BASES DIMINISHED. PATIENT STATES WHEN SHE TAKES DEEP BREATH, SOMETIMES MAKES 
HER COUGH AND RIB CAGE HURTS. RN GAVE INCENTIVE SPIROMETER TO PROMOTE LUNG EXPANSION. RN 
EDUCATED PATIENT USAGE AND ASKED TO DEMONSTRATE.  PATIENT AT BEST EFFORT ONLY ABLE TO REACH 
500ML. ENCOURAGED TO CONTINUE TO USE EVERY HOUR WHILE AWAKE. WILL CONTINUE TO MONITOR

## 2019-06-22 NOTE — NUR
OPENING SHIFT 

RECEIVED REPORT FROM DAY SHIFT RN. ASSUMED CARE OF PATIENT. PATIENT IN BED SLEEPING WITH NO 
SIGNS OR SYMPTOMS OF SOB, PAIN OR DISTRESS. CURRENTLY ON ROOM AIR, 02 SAT - 99%. LEFT 
ANTECUBITAL IV - CLEAN/DRY/INTACT. BED IN LOWEST POSITION, SIDE RAILS UP X2, CALL LIGHT 
WITHIN REACH. WILL CONTINUE TO MONITOR. 

-------------------------------------------------------------------------------

Addendum: 06/22/19 at 1629 by SHER LINARES RN RN

-------------------------------------------------------------------------------

** LEFT FOREARM IV

## 2019-06-22 NOTE — NUR
pt transferred to karin



pt transferred to karin, via wheel chair attached to cardiac monitor. no ss of distress, pt vs 
wnl. all belongings transported with pt.

## 2019-06-23 VITALS — DIASTOLIC BLOOD PRESSURE: 66 MMHG | SYSTOLIC BLOOD PRESSURE: 116 MMHG

## 2019-06-23 VITALS — DIASTOLIC BLOOD PRESSURE: 80 MMHG | SYSTOLIC BLOOD PRESSURE: 108 MMHG

## 2019-06-23 VITALS — DIASTOLIC BLOOD PRESSURE: 64 MMHG | SYSTOLIC BLOOD PRESSURE: 100 MMHG

## 2019-06-23 VITALS — SYSTOLIC BLOOD PRESSURE: 108 MMHG | DIASTOLIC BLOOD PRESSURE: 69 MMHG

## 2019-06-23 LAB
ANION GAP SERPL CALCULATED.3IONS-SCNC: 7 MMOL/L (ref 5–15)
BUN SERPL-MCNC: 13 MG/DL (ref 7–18)
BUN/CREAT SERPL: 14.3
CALCIUM SERPL-MCNC: 8.5 MG/DL (ref 8.5–10.1)
CHLORIDE SERPL-SCNC: 103 MMOL/L (ref 98–107)
CO2 SERPL-SCNC: 28 MMOL/L (ref 21–32)
GLUCOSE SERPL-MCNC: 90 MG/DL (ref 74–106)
POTASSIUM SERPL-SCNC: 4 MMOL/L (ref 3.5–5.1)
SODIUM SERPL-SCNC: 138 MMOL/L (ref 136–145)

## 2019-06-23 RX ADMIN — Medication SCH MG: at 17:23

## 2019-06-23 RX ADMIN — CALCITRIOL SCH MCG: 0.25 CAPSULE, LIQUID FILLED ORAL at 22:37

## 2019-06-23 RX ADMIN — Medication SCH MG: at 11:14

## 2019-06-23 RX ADMIN — CALCITRIOL SCH MCG: 0.25 CAPSULE, LIQUID FILLED ORAL at 10:54

## 2019-06-23 RX ADMIN — Medication SCH MG: at 07:01

## 2019-06-23 RX ADMIN — MAGNESIUM OXIDE TAB 400 MG (241.3 MG ELEMENTAL MG) SCH MG: 400 (241.3 MG) TAB at 10:54

## 2019-06-23 RX ADMIN — LEVOTHYROXINE SODIUM SCH MCG: 50 TABLET ORAL at 07:01

## 2019-06-23 RX ADMIN — Medication SCH MG: at 22:37

## 2019-06-23 NOTE — NUR
Medication dosages, usages, and side effects explained to patient. Patient verbalized 
understanding. Will continue to monitor.

## 2019-06-23 NOTE — NUR
Pt remained stable this shift.  No S/S of distress.  Pt ambulated this shift and often was 
observed using incentive spirometer.  Report given, care endorsed.

## 2019-06-23 NOTE — NUR
End of shift note:

Patient sitting up in bed talking with family. Patient A&Ox4. Patient on the monitor, on 
room air, VS WNL. IV left AC 22G saline locked, patent, clean, dry, and intact. No S/S of 
distress/SOB or pain. Bed locked and in the lowest position, side rails up x2, call light 
with in reach. Will continue to monitor. Report to be given to night shift RN.

## 2019-06-23 NOTE — NUR
Opening Shift Note

Assumed care of patient, awake and alert. Patient A&Ox4. Patient on the monitor, on room 
air, VS WNL. IV left AC 22G saline locked, patent, clean, dry, and intact. No S/S of 
distress/SOB or pain. Bed locked and in the lowest position, side rails up x2, call light 
with in reach. Instructed on POC and to call for assist PRN. Will continue to monitor.

## 2019-06-24 VITALS — SYSTOLIC BLOOD PRESSURE: 105 MMHG | DIASTOLIC BLOOD PRESSURE: 71 MMHG

## 2019-06-24 VITALS — SYSTOLIC BLOOD PRESSURE: 102 MMHG | DIASTOLIC BLOOD PRESSURE: 68 MMHG

## 2019-06-24 VITALS — SYSTOLIC BLOOD PRESSURE: 135 MMHG | DIASTOLIC BLOOD PRESSURE: 88 MMHG

## 2019-06-24 VITALS — DIASTOLIC BLOOD PRESSURE: 77 MMHG | SYSTOLIC BLOOD PRESSURE: 120 MMHG

## 2019-06-24 VITALS — SYSTOLIC BLOOD PRESSURE: 115 MMHG | DIASTOLIC BLOOD PRESSURE: 56 MMHG

## 2019-06-24 VITALS — SYSTOLIC BLOOD PRESSURE: 120 MMHG | DIASTOLIC BLOOD PRESSURE: 77 MMHG

## 2019-06-24 RX ADMIN — Medication SCH MG: at 05:39

## 2019-06-24 RX ADMIN — MAGNESIUM OXIDE TAB 400 MG (241.3 MG ELEMENTAL MG) SCH MG: 400 (241.3 MG) TAB at 10:33

## 2019-06-24 RX ADMIN — Medication SCH MG: at 12:00

## 2019-06-24 RX ADMIN — LEVOTHYROXINE SODIUM SCH MCG: 50 TABLET ORAL at 05:39

## 2019-06-24 NOTE — NUR
Families visit her at this time, brought food for patient, patient sitting and talking to 
her families.

## 2019-06-24 NOTE — NUR
Provided the information about high phosphate foods to avoid for patient (Dietician provided 
in the chart).

## 2019-06-24 NOTE — NUR
Opening Shift Note

Assumed care of patient, awake and alert.  No S/S of distress/SOB or pain or cramping.  
Instructed on POC and to call for assist PRN, will continue to monitor for changes Q1hr and 
PRN.

## 2019-06-24 NOTE — NUR
Patient stated that she called her primary doctor's office and got the appointment on 
6/28/19 at 2.15pm.

## 2019-06-24 NOTE — NUR
Dr. Burr at the bedside, seen and examined patient at this time, plan of care discussed 
with patient, discussed about medication and monitor Labs as order, and follow up with PCP. 
Patient made aware, verbalized understanding. Will discharge home today.

## 2019-06-24 NOTE — NUR
Discharge instructions given as ordered. Encourage to follow up with PMD as instructed. All 
questions and concerns addressed. Patient verbalized understanding.  Medication 
reconciliation form completed and copy given to patient. IV removed with catheter intact, 
pressure dressing applied. Patient stated that She has the appointment to see her Primary 
doctor on 6/27/19 at 2.15 pm, and the appointment to see Dr. Thomas on 6/28/19 at 2.15 pm. 
Patient taken to vehicle via wheelchair with all personal belongings, accompanied by staff 
and family member. No distress noted at time of departure.

## 2019-06-28 ENCOUNTER — HOSPITAL ENCOUNTER (EMERGENCY)
Dept: HOSPITAL 15 - ER | Age: 29
Discharge: HOME | End: 2019-06-28
Payer: MEDICAID

## 2019-06-28 VITALS — HEIGHT: 61 IN | BODY MASS INDEX: 20.77 KG/M2 | WEIGHT: 110 LBS

## 2019-06-28 VITALS — DIASTOLIC BLOOD PRESSURE: 64 MMHG | SYSTOLIC BLOOD PRESSURE: 112 MMHG

## 2019-06-28 DIAGNOSIS — Z79.899: ICD-10-CM

## 2019-06-28 DIAGNOSIS — R11.0: ICD-10-CM

## 2019-06-28 DIAGNOSIS — N39.0: Primary | ICD-10-CM

## 2019-06-28 LAB
ALBUMIN SERPL-MCNC: 4 G/DL (ref 3.4–5)
ALP SERPL-CCNC: 103 U/L (ref 45–117)
ALT SERPL-CCNC: 14 U/L (ref 13–56)
ANION GAP SERPL CALCULATED.3IONS-SCNC: 7 MMOL/L (ref 5–15)
BILIRUB SERPL-MCNC: 0.6 MG/DL (ref 0.2–1)
BUN SERPL-MCNC: 12 MG/DL (ref 7–18)
BUN/CREAT SERPL: 13.8
CALCIUM SERPL-MCNC: 8.7 MG/DL (ref 8.5–10.1)
CHLORIDE SERPL-SCNC: 102 MMOL/L (ref 98–107)
CO2 SERPL-SCNC: 28 MMOL/L (ref 21–32)
GLUCOSE SERPL-MCNC: 90 MG/DL (ref 74–106)
HCT VFR BLD AUTO: 33.7 % (ref 36–46)
HGB BLD-MCNC: 11.2 G/DL (ref 12.2–16.2)
LIPASE SERPL-CCNC: 163 U/L (ref 73–393)
MAGNESIUM SERPL-MCNC: 1.6 MG/DL (ref 1.6–2.6)
MCH RBC QN AUTO: 26.2 PG (ref 28–32)
MCV RBC AUTO: 78.6 FL (ref 80–100)
NRBC BLD QL AUTO: 0.1 %
POTASSIUM SERPL-SCNC: 3.3 MMOL/L (ref 3.5–5.1)
PROT SERPL-MCNC: 7.6 G/DL (ref 6.4–8.2)
SODIUM SERPL-SCNC: 137 MMOL/L (ref 136–145)

## 2019-06-28 PROCEDURE — 96374 THER/PROPH/DIAG INJ IV PUSH: CPT

## 2019-06-28 PROCEDURE — 85025 COMPLETE CBC W/AUTO DIFF WBC: CPT

## 2019-06-28 PROCEDURE — 36415 COLL VENOUS BLD VENIPUNCTURE: CPT

## 2019-06-28 PROCEDURE — 80053 COMPREHEN METABOLIC PANEL: CPT

## 2019-06-28 PROCEDURE — 83690 ASSAY OF LIPASE: CPT

## 2019-06-28 PROCEDURE — 99283 EMERGENCY DEPT VISIT LOW MDM: CPT

## 2019-06-28 PROCEDURE — 83735 ASSAY OF MAGNESIUM: CPT

## 2019-06-28 PROCEDURE — 96375 TX/PRO/DX INJ NEW DRUG ADDON: CPT

## 2019-06-28 PROCEDURE — 81001 URINALYSIS AUTO W/SCOPE: CPT

## 2019-06-28 PROCEDURE — 94761 N-INVAS EAR/PLS OXIMETRY MLT: CPT

## 2019-07-02 ENCOUNTER — HOSPITAL ENCOUNTER (EMERGENCY)
Dept: HOSPITAL 15 - ER | Age: 29
Discharge: HOME | End: 2019-07-02
Payer: MEDICAID

## 2019-07-02 VITALS — BODY MASS INDEX: 20.77 KG/M2 | WEIGHT: 110 LBS | HEIGHT: 61 IN

## 2019-07-02 VITALS — SYSTOLIC BLOOD PRESSURE: 120 MMHG | DIASTOLIC BLOOD PRESSURE: 75 MMHG

## 2019-07-02 DIAGNOSIS — Z86.39: ICD-10-CM

## 2019-07-02 DIAGNOSIS — E87.6: ICD-10-CM

## 2019-07-02 DIAGNOSIS — E83.51: Primary | ICD-10-CM

## 2019-07-02 LAB
ALBUMIN SERPL-MCNC: 4 G/DL (ref 3.4–5)
ALP SERPL-CCNC: 95 U/L (ref 45–117)
ALT SERPL-CCNC: 15 U/L (ref 13–56)
ANION GAP SERPL CALCULATED.3IONS-SCNC: 8 MMOL/L (ref 5–15)
BILIRUB SERPL-MCNC: 0.3 MG/DL (ref 0.2–1)
BUN SERPL-MCNC: 10 MG/DL (ref 7–18)
BUN/CREAT SERPL: 12.5
CALCIUM SERPL-MCNC: 8.2 MG/DL (ref 8.5–10.1)
CHLORIDE SERPL-SCNC: 102 MMOL/L (ref 98–107)
CO2 SERPL-SCNC: 28 MMOL/L (ref 21–32)
GLUCOSE SERPL-MCNC: 115 MG/DL (ref 74–106)
HCT VFR BLD AUTO: 34.3 % (ref 36–46)
HGB BLD-MCNC: 11.4 G/DL (ref 12.2–16.2)
MCH RBC QN AUTO: 26.1 PG (ref 28–32)
MCV RBC AUTO: 78.9 FL (ref 80–100)
NRBC BLD QL AUTO: 0.1 %
POTASSIUM SERPL-SCNC: 3.2 MMOL/L (ref 3.5–5.1)
PROT SERPL-MCNC: 7.8 G/DL (ref 6.4–8.2)
SODIUM SERPL-SCNC: 138 MMOL/L (ref 136–145)

## 2019-07-02 PROCEDURE — 99284 EMERGENCY DEPT VISIT MOD MDM: CPT

## 2019-07-02 PROCEDURE — 94761 N-INVAS EAR/PLS OXIMETRY MLT: CPT

## 2019-07-02 PROCEDURE — 36415 COLL VENOUS BLD VENIPUNCTURE: CPT

## 2019-07-02 PROCEDURE — 80053 COMPREHEN METABOLIC PANEL: CPT

## 2019-07-02 PROCEDURE — 85025 COMPLETE CBC W/AUTO DIFF WBC: CPT

## 2019-07-02 PROCEDURE — 93005 ELECTROCARDIOGRAM TRACING: CPT

## 2019-07-02 PROCEDURE — 96365 THER/PROPH/DIAG IV INF INIT: CPT

## 2019-08-18 ENCOUNTER — HOSPITAL ENCOUNTER (EMERGENCY)
Dept: HOSPITAL 15 - ER | Age: 29
Discharge: HOME | End: 2019-08-18
Payer: MEDICAID

## 2019-08-18 VITALS — SYSTOLIC BLOOD PRESSURE: 99 MMHG | DIASTOLIC BLOOD PRESSURE: 59 MMHG

## 2019-08-18 VITALS — BODY MASS INDEX: 20.77 KG/M2 | HEIGHT: 61 IN | WEIGHT: 110 LBS

## 2019-08-18 DIAGNOSIS — N39.0: Primary | ICD-10-CM

## 2019-08-18 DIAGNOSIS — Z79.899: ICD-10-CM

## 2019-08-18 DIAGNOSIS — E83.51: ICD-10-CM

## 2019-08-18 DIAGNOSIS — E86.0: ICD-10-CM

## 2019-08-18 LAB
ALBUMIN SERPL-MCNC: 4.5 G/DL (ref 3.4–5)
ALP SERPL-CCNC: 57 U/L (ref 45–117)
ALT SERPL-CCNC: 16 U/L (ref 13–56)
ANION GAP SERPL CALCULATED.3IONS-SCNC: 9 MMOL/L (ref 5–15)
BILIRUB SERPL-MCNC: 0.7 MG/DL (ref 0.2–1)
BUN SERPL-MCNC: 12 MG/DL (ref 7–18)
BUN/CREAT SERPL: 14
CALCIUM SERPL-MCNC: 7.7 MG/DL (ref 8.5–10.1)
CHLORIDE SERPL-SCNC: 103 MMOL/L (ref 98–107)
CO2 SERPL-SCNC: 26 MMOL/L (ref 21–32)
GLUCOSE SERPL-MCNC: 117 MG/DL (ref 74–106)
HCT VFR BLD AUTO: 34.8 % (ref 36–46)
HGB BLD-MCNC: 11.2 G/DL (ref 12.2–16.2)
MCH RBC QN AUTO: 26.3 PG (ref 28–32)
MCV RBC AUTO: 81.5 FL (ref 80–100)
NRBC BLD QL AUTO: 0 %
POTASSIUM SERPL-SCNC: 3.3 MMOL/L (ref 3.5–5.1)
PROT SERPL-MCNC: 8 G/DL (ref 6.4–8.2)
SODIUM SERPL-SCNC: 138 MMOL/L (ref 136–145)

## 2019-08-18 PROCEDURE — 99283 EMERGENCY DEPT VISIT LOW MDM: CPT

## 2019-08-18 PROCEDURE — 96361 HYDRATE IV INFUSION ADD-ON: CPT

## 2019-08-18 PROCEDURE — 96374 THER/PROPH/DIAG INJ IV PUSH: CPT

## 2019-08-18 PROCEDURE — 36415 COLL VENOUS BLD VENIPUNCTURE: CPT

## 2019-08-18 PROCEDURE — 94761 N-INVAS EAR/PLS OXIMETRY MLT: CPT

## 2019-08-18 PROCEDURE — 80053 COMPREHEN METABOLIC PANEL: CPT

## 2019-08-18 PROCEDURE — 85025 COMPLETE CBC W/AUTO DIFF WBC: CPT

## 2019-08-18 PROCEDURE — 81001 URINALYSIS AUTO W/SCOPE: CPT

## 2022-03-22 ENCOUNTER — HOSPITAL ENCOUNTER (EMERGENCY)
Dept: HOSPITAL 15 - ER | Age: 32
Discharge: HOME | End: 2022-03-22
Payer: MEDICAID

## 2022-03-22 VITALS — WEIGHT: 114 LBS | HEIGHT: 61 IN | BODY MASS INDEX: 21.52 KG/M2

## 2022-03-22 VITALS — SYSTOLIC BLOOD PRESSURE: 100 MMHG | DIASTOLIC BLOOD PRESSURE: 81 MMHG

## 2022-03-22 DIAGNOSIS — E03.9: ICD-10-CM

## 2022-03-22 DIAGNOSIS — Z79.899: ICD-10-CM

## 2022-03-22 DIAGNOSIS — R79.1: ICD-10-CM

## 2022-03-22 DIAGNOSIS — Z90.89: ICD-10-CM

## 2022-03-22 DIAGNOSIS — K52.9: Primary | ICD-10-CM

## 2022-03-22 LAB
ALBUMIN SERPL-MCNC: 4.2 G/DL (ref 3.4–5)
ALP SERPL-CCNC: 58 U/L (ref 45–117)
ALT SERPL-CCNC: 15 U/L (ref 13–56)
ANION GAP SERPL CALCULATED.3IONS-SCNC: 7 MMOL/L (ref 5–15)
BILIRUB SERPL-MCNC: 0.6 MG/DL (ref 0.2–1)
BUN SERPL-MCNC: 14 MG/DL (ref 7–18)
BUN/CREAT SERPL: 16.9
CALCIUM SERPL-MCNC: 8 MG/DL (ref 8.5–10.1)
CHLORIDE SERPL-SCNC: 107 MMOL/L (ref 98–107)
CO2 SERPL-SCNC: 24 MMOL/L (ref 21–32)
GLUCOSE SERPL-MCNC: 98 MG/DL (ref 74–106)
HCT VFR BLD AUTO: 37.7 % (ref 36–46)
HGB BLD-MCNC: 12.9 G/DL (ref 12.2–16.2)
MCH RBC QN AUTO: 28.6 PG (ref 28–32)
MCV RBC AUTO: 83.9 FL (ref 80–100)
NRBC BLD QL AUTO: 0.3 %
POTASSIUM SERPL-SCNC: 3.6 MMOL/L (ref 3.5–5.1)
PROT SERPL-MCNC: 8.3 G/DL (ref 6.4–8.2)
SODIUM SERPL-SCNC: 138 MMOL/L (ref 136–145)

## 2022-03-22 PROCEDURE — 85379 FIBRIN DEGRADATION QUANT: CPT

## 2022-03-22 PROCEDURE — 80053 COMPREHEN METABOLIC PANEL: CPT

## 2022-03-22 PROCEDURE — 96361 HYDRATE IV INFUSION ADD-ON: CPT

## 2022-03-22 PROCEDURE — 71045 X-RAY EXAM CHEST 1 VIEW: CPT

## 2022-03-22 PROCEDURE — 81001 URINALYSIS AUTO W/SCOPE: CPT

## 2022-03-22 PROCEDURE — 85025 COMPLETE CBC W/AUTO DIFF WBC: CPT

## 2022-03-22 PROCEDURE — 99285 EMERGENCY DEPT VISIT HI MDM: CPT

## 2022-03-22 PROCEDURE — 84443 ASSAY THYROID STIM HORMONE: CPT

## 2022-03-22 PROCEDURE — 71275 CT ANGIOGRAPHY CHEST: CPT

## 2022-03-22 PROCEDURE — 36415 COLL VENOUS BLD VENIPUNCTURE: CPT

## 2022-03-22 PROCEDURE — 83880 ASSAY OF NATRIURETIC PEPTIDE: CPT

## 2022-03-22 PROCEDURE — 96374 THER/PROPH/DIAG INJ IV PUSH: CPT

## 2022-04-14 ENCOUNTER — HOSPITAL ENCOUNTER (EMERGENCY)
Dept: HOSPITAL 15 - ER | Age: 32
LOS: 1 days | Discharge: LEFT BEFORE DISCHARGE | End: 2022-04-15
Payer: MEDICAID

## 2022-04-14 VITALS — BODY MASS INDEX: 21.52 KG/M2 | WEIGHT: 114 LBS | HEIGHT: 61 IN

## 2022-04-14 VITALS — SYSTOLIC BLOOD PRESSURE: 118 MMHG | DIASTOLIC BLOOD PRESSURE: 69 MMHG

## 2022-04-14 DIAGNOSIS — M79.661: Primary | ICD-10-CM

## 2022-04-14 LAB
ALBUMIN SERPL-MCNC: 4.1 G/DL (ref 3.4–5)
ALP SERPL-CCNC: 61 U/L (ref 45–117)
ALT SERPL-CCNC: 17 U/L (ref 13–56)
ANION GAP SERPL CALCULATED.3IONS-SCNC: 5 MMOL/L (ref 5–15)
BILIRUB SERPL-MCNC: 0.4 MG/DL (ref 0.2–1)
BUN SERPL-MCNC: 10 MG/DL (ref 7–18)
BUN/CREAT SERPL: 12.5
CALCIUM SERPL-MCNC: 8.2 MG/DL (ref 8.5–10.1)
CHLORIDE SERPL-SCNC: 104 MMOL/L (ref 98–107)
CO2 SERPL-SCNC: 28 MMOL/L (ref 21–32)
GLUCOSE SERPL-MCNC: 99 MG/DL (ref 74–106)
HCT VFR BLD AUTO: 38.4 % (ref 36–46)
HGB BLD-MCNC: 13 G/DL (ref 12.2–16.2)
MCH RBC QN AUTO: 28.6 PG (ref 28–32)
MCV RBC AUTO: 84.2 FL (ref 80–100)
NRBC BLD QL AUTO: 0.2 %
POTASSIUM SERPL-SCNC: 3.9 MMOL/L (ref 3.5–5.1)
PROT SERPL-MCNC: 8.5 G/DL (ref 6.4–8.2)
SODIUM SERPL-SCNC: 137 MMOL/L (ref 136–145)

## 2022-04-14 PROCEDURE — 93971 EXTREMITY STUDY: CPT

## 2022-04-14 PROCEDURE — 36415 COLL VENOUS BLD VENIPUNCTURE: CPT

## 2022-04-14 PROCEDURE — 85025 COMPLETE CBC W/AUTO DIFF WBC: CPT

## 2022-04-14 PROCEDURE — 80053 COMPREHEN METABOLIC PANEL: CPT

## 2022-07-26 ENCOUNTER — HOSPITAL ENCOUNTER (EMERGENCY)
Dept: HOSPITAL 15 - ER | Age: 32
LOS: 1 days | Discharge: HOME | End: 2022-07-27
Payer: MEDICAID

## 2022-07-26 VITALS — HEIGHT: 61 IN | WEIGHT: 114.64 LBS | BODY MASS INDEX: 21.64 KG/M2

## 2022-07-26 DIAGNOSIS — R11.2: ICD-10-CM

## 2022-07-26 DIAGNOSIS — Z79.899: ICD-10-CM

## 2022-07-26 DIAGNOSIS — Z90.89: ICD-10-CM

## 2022-07-26 DIAGNOSIS — E83.51: Primary | ICD-10-CM

## 2022-07-26 DIAGNOSIS — E03.9: ICD-10-CM

## 2022-07-26 DIAGNOSIS — Z20.822: ICD-10-CM

## 2022-07-26 LAB
ALBUMIN SERPL-MCNC: 4.1 G/DL (ref 3.4–5)
ALP SERPL-CCNC: 62 U/L (ref 45–117)
ALT SERPL-CCNC: 17 U/L (ref 13–56)
ANION GAP SERPL CALCULATED.3IONS-SCNC: 14 MMOL/L (ref 5–15)
BILIRUB SERPL-MCNC: 0.4 MG/DL (ref 0.2–1)
BUN SERPL-MCNC: 9 MG/DL (ref 7–18)
BUN/CREAT SERPL: 12
CALCIUM SERPL-MCNC: 6.5 MG/DL (ref 8.5–10.1)
CHLORIDE SERPL-SCNC: 100 MMOL/L (ref 98–107)
CO2 SERPL-SCNC: 20 MMOL/L (ref 21–32)
GLUCOSE SERPL-MCNC: 108 MG/DL (ref 74–106)
HCT VFR BLD AUTO: 35.1 % (ref 36–46)
HGB BLD-MCNC: 11.5 G/DL (ref 12.2–16.2)
MCH RBC QN AUTO: 26.9 PG (ref 28–32)
MCV RBC AUTO: 82.2 FL (ref 80–100)
NRBC BLD QL AUTO: 0.1 %
POTASSIUM SERPL-SCNC: 3.7 MMOL/L (ref 3.5–5.1)
PROT SERPL-MCNC: 8.4 G/DL (ref 6.4–8.2)
SODIUM SERPL-SCNC: 134 MMOL/L (ref 136–145)

## 2022-07-26 PROCEDURE — 71045 X-RAY EXAM CHEST 1 VIEW: CPT

## 2022-07-26 PROCEDURE — 80053 COMPREHEN METABOLIC PANEL: CPT

## 2022-07-26 PROCEDURE — 82310 ASSAY OF CALCIUM: CPT

## 2022-07-26 PROCEDURE — 84443 ASSAY THYROID STIM HORMONE: CPT

## 2022-07-26 PROCEDURE — 96365 THER/PROPH/DIAG IV INF INIT: CPT

## 2022-07-26 PROCEDURE — 99285 EMERGENCY DEPT VISIT HI MDM: CPT

## 2022-07-26 PROCEDURE — 85025 COMPLETE CBC W/AUTO DIFF WBC: CPT

## 2022-07-26 PROCEDURE — 96366 THER/PROPH/DIAG IV INF ADDON: CPT

## 2022-07-26 PROCEDURE — 84484 ASSAY OF TROPONIN QUANT: CPT

## 2022-07-26 PROCEDURE — 36415 COLL VENOUS BLD VENIPUNCTURE: CPT

## 2022-07-26 PROCEDURE — 96376 TX/PRO/DX INJ SAME DRUG ADON: CPT

## 2022-07-26 PROCEDURE — 96375 TX/PRO/DX INJ NEW DRUG ADDON: CPT

## 2022-07-26 PROCEDURE — 87426 SARSCOV CORONAVIRUS AG IA: CPT

## 2022-07-26 PROCEDURE — 93005 ELECTROCARDIOGRAM TRACING: CPT

## 2022-07-27 VITALS — SYSTOLIC BLOOD PRESSURE: 121 MMHG | DIASTOLIC BLOOD PRESSURE: 69 MMHG

## 2022-07-27 RX ADMIN — CALCIUM GLUCONATE SCH MLS/HR: 20 INJECTION, SOLUTION INTRAVENOUS at 01:27

## 2022-07-27 RX ADMIN — CALCIUM GLUCONATE SCH MLS/HR: 20 INJECTION, SOLUTION INTRAVENOUS at 01:03

## 2022-12-11 ENCOUNTER — HOSPITAL ENCOUNTER (EMERGENCY)
Dept: HOSPITAL 15 - ER | Age: 32
Discharge: LEFT BEFORE DISCHARGE | End: 2022-12-11
Payer: MEDICAID

## 2022-12-11 VITALS — DIASTOLIC BLOOD PRESSURE: 69 MMHG | SYSTOLIC BLOOD PRESSURE: 124 MMHG

## 2022-12-11 VITALS — HEIGHT: 66 IN | BODY MASS INDEX: 23.03 KG/M2 | WEIGHT: 143.3 LBS

## 2022-12-11 DIAGNOSIS — Z79.899: ICD-10-CM

## 2022-12-11 DIAGNOSIS — E83.51: Primary | ICD-10-CM

## 2022-12-11 DIAGNOSIS — Z90.89: ICD-10-CM

## 2022-12-11 LAB
ALBUMIN SERPL-MCNC: 3.7 G/DL (ref 3.4–5)
ALP SERPL-CCNC: 54 U/L (ref 45–117)
ALT SERPL-CCNC: 16 U/L (ref 13–56)
ANION GAP SERPL CALCULATED.3IONS-SCNC: 9 MMOL/L (ref 5–15)
BILIRUB SERPL-MCNC: 0.6 MG/DL (ref 0.2–1)
BUN SERPL-MCNC: 8 MG/DL (ref 7–18)
BUN/CREAT SERPL: 11.8
CALCIUM SERPL-MCNC: 6.2 MG/DL (ref 8.5–10.1)
CHLORIDE SERPL-SCNC: 106 MMOL/L (ref 98–107)
CO2 SERPL-SCNC: 25 MMOL/L (ref 21–32)
GLUCOSE SERPL-MCNC: 144 MG/DL (ref 74–106)
HCT VFR BLD AUTO: 36 % (ref 36–46)
HGB BLD-MCNC: 11.9 G/DL (ref 12.2–16.2)
MAGNESIUM SERPL-MCNC: 1.6 MG/DL (ref 1.6–2.6)
MCH RBC QN AUTO: 26.8 PG (ref 28–32)
MCV RBC AUTO: 81.2 FL (ref 80–100)
NRBC BLD QL AUTO: 0.1 %
POTASSIUM SERPL-SCNC: 3.4 MMOL/L (ref 3.5–5.1)
PROT SERPL-MCNC: 7.2 G/DL (ref 6.4–8.2)
SODIUM SERPL-SCNC: 140 MMOL/L (ref 136–145)

## 2022-12-11 PROCEDURE — 85025 COMPLETE CBC W/AUTO DIFF WBC: CPT

## 2022-12-11 PROCEDURE — 99285 EMERGENCY DEPT VISIT HI MDM: CPT

## 2022-12-11 PROCEDURE — 93005 ELECTROCARDIOGRAM TRACING: CPT

## 2022-12-11 PROCEDURE — 84443 ASSAY THYROID STIM HORMONE: CPT

## 2022-12-11 PROCEDURE — 96375 TX/PRO/DX INJ NEW DRUG ADDON: CPT

## 2022-12-11 PROCEDURE — 80053 COMPREHEN METABOLIC PANEL: CPT

## 2022-12-11 PROCEDURE — 83735 ASSAY OF MAGNESIUM: CPT

## 2022-12-11 PROCEDURE — 84100 ASSAY OF PHOSPHORUS: CPT

## 2022-12-11 PROCEDURE — 96365 THER/PROPH/DIAG IV INF INIT: CPT

## 2022-12-11 PROCEDURE — 36415 COLL VENOUS BLD VENIPUNCTURE: CPT

## 2023-02-03 ENCOUNTER — HOSPITAL ENCOUNTER (INPATIENT)
Dept: HOSPITAL 15 - ER | Age: 33
LOS: 3 days | Discharge: LEFT BEFORE DISCHARGE | DRG: 425 | End: 2023-02-06
Attending: HOSPITALIST | Admitting: NURSE PRACTITIONER
Payer: MEDICAID

## 2023-02-03 VITALS — WEIGHT: 117.51 LBS | BODY MASS INDEX: 17.4 KG/M2 | HEIGHT: 69 IN

## 2023-02-03 DIAGNOSIS — Z82.5: ICD-10-CM

## 2023-02-03 DIAGNOSIS — F41.9: ICD-10-CM

## 2023-02-03 DIAGNOSIS — N85.2: ICD-10-CM

## 2023-02-03 DIAGNOSIS — K76.9: ICD-10-CM

## 2023-02-03 DIAGNOSIS — E03.9: ICD-10-CM

## 2023-02-03 DIAGNOSIS — D64.9: ICD-10-CM

## 2023-02-03 DIAGNOSIS — Z83.3: ICD-10-CM

## 2023-02-03 DIAGNOSIS — Z79.891: ICD-10-CM

## 2023-02-03 DIAGNOSIS — E11.9: ICD-10-CM

## 2023-02-03 DIAGNOSIS — Z83.49: ICD-10-CM

## 2023-02-03 DIAGNOSIS — E83.51: Primary | ICD-10-CM

## 2023-02-03 DIAGNOSIS — Z79.899: ICD-10-CM

## 2023-02-03 DIAGNOSIS — Z82.3: ICD-10-CM

## 2023-02-03 DIAGNOSIS — Z82.49: ICD-10-CM

## 2023-02-03 DIAGNOSIS — Z80.3: ICD-10-CM

## 2023-02-03 DIAGNOSIS — E83.52: ICD-10-CM

## 2023-02-03 DIAGNOSIS — D72.829: ICD-10-CM

## 2023-02-03 DIAGNOSIS — E83.42: ICD-10-CM

## 2023-02-03 LAB
ALBUMIN SERPL-MCNC: 3.9 G/DL (ref 3.4–5)
ALP SERPL-CCNC: 50 U/L (ref 45–117)
ALT SERPL-CCNC: 14 U/L (ref 13–56)
ANION GAP SERPL CALCULATED.3IONS-SCNC: 13 MMOL/L (ref 5–15)
BILIRUB SERPL-MCNC: 0.5 MG/DL (ref 0.2–1)
BUN SERPL-MCNC: 10 MG/DL (ref 7–18)
BUN/CREAT SERPL: 15.2
CALCIUM SERPL-MCNC: 6.4 MG/DL (ref 8.5–10.1)
CHLORIDE SERPL-SCNC: 105 MMOL/L (ref 98–107)
CO2 SERPL-SCNC: 22 MMOL/L (ref 21–32)
GLUCOSE SERPL-MCNC: 100 MG/DL (ref 74–106)
HCT VFR BLD AUTO: 22.3 % (ref 36–46)
HGB BLD-MCNC: 7.4 G/DL (ref 12.2–16.2)
MAGNESIUM SERPL-MCNC: 1.5 MG/DL (ref 1.6–2.6)
MCH RBC QN AUTO: 26.9 PG (ref 28–32)
MCV RBC AUTO: 81 FL (ref 80–100)
NRBC BLD QL AUTO: 0 %
POTASSIUM SERPL-SCNC: 3.4 MMOL/L (ref 3.5–5.1)
PROT SERPL-MCNC: 7.4 G/DL (ref 6.4–8.2)
SODIUM SERPL-SCNC: 140 MMOL/L (ref 136–145)

## 2023-02-03 PROCEDURE — 87426 SARSCOV CORONAVIRUS AG IA: CPT

## 2023-02-03 PROCEDURE — 85025 COMPLETE CBC W/AUTO DIFF WBC: CPT

## 2023-02-03 PROCEDURE — 36415 COLL VENOUS BLD VENIPUNCTURE: CPT

## 2023-02-03 PROCEDURE — 80048 BASIC METABOLIC PNL TOTAL CA: CPT

## 2023-02-03 PROCEDURE — 84439 ASSAY OF FREE THYROXINE: CPT

## 2023-02-03 PROCEDURE — 96365 THER/PROPH/DIAG IV INF INIT: CPT

## 2023-02-03 PROCEDURE — 83735 ASSAY OF MAGNESIUM: CPT

## 2023-02-03 PROCEDURE — 74176 CT ABD & PELVIS W/O CONTRAST: CPT

## 2023-02-03 PROCEDURE — 93005 ELECTROCARDIOGRAM TRACING: CPT

## 2023-02-03 PROCEDURE — 84702 CHORIONIC GONADOTROPIN TEST: CPT

## 2023-02-03 PROCEDURE — 85730 THROMBOPLASTIN TIME PARTIAL: CPT

## 2023-02-03 PROCEDURE — 83880 ASSAY OF NATRIURETIC PEPTIDE: CPT

## 2023-02-03 PROCEDURE — 96375 TX/PRO/DX INJ NEW DRUG ADDON: CPT

## 2023-02-03 PROCEDURE — 83970 ASSAY OF PARATHORMONE: CPT

## 2023-02-03 PROCEDURE — 85610 PROTHROMBIN TIME: CPT

## 2023-02-03 PROCEDURE — 84443 ASSAY THYROID STIM HORMONE: CPT

## 2023-02-03 PROCEDURE — 84484 ASSAY OF TROPONIN QUANT: CPT

## 2023-02-03 PROCEDURE — 96367 TX/PROPH/DG ADDL SEQ IV INF: CPT

## 2023-02-03 PROCEDURE — 76856 US EXAM PELVIC COMPLETE: CPT

## 2023-02-03 PROCEDURE — 80053 COMPREHEN METABOLIC PANEL: CPT

## 2023-02-04 VITALS — DIASTOLIC BLOOD PRESSURE: 61 MMHG | SYSTOLIC BLOOD PRESSURE: 95 MMHG

## 2023-02-04 VITALS — DIASTOLIC BLOOD PRESSURE: 74 MMHG | SYSTOLIC BLOOD PRESSURE: 109 MMHG

## 2023-02-04 VITALS — SYSTOLIC BLOOD PRESSURE: 106 MMHG | DIASTOLIC BLOOD PRESSURE: 53 MMHG

## 2023-02-04 VITALS — SYSTOLIC BLOOD PRESSURE: 96 MMHG | DIASTOLIC BLOOD PRESSURE: 53 MMHG

## 2023-02-04 LAB
ALBUMIN SERPL-MCNC: 3.6 G/DL (ref 3.4–5)
ALP SERPL-CCNC: 49 U/L (ref 45–117)
ALT SERPL-CCNC: 13 U/L (ref 13–56)
ANION GAP SERPL CALCULATED.3IONS-SCNC: 8 MMOL/L (ref 5–15)
APTT PPP: 28.8 SEC (ref 24.6–33.4)
BILIRUB SERPL-MCNC: 0.8 MG/DL (ref 0.2–1)
BUN SERPL-MCNC: 7 MG/DL (ref 7–18)
BUN/CREAT SERPL: 9.9
CALCIUM SERPL-MCNC: 7.2 MG/DL (ref 8.5–10.1)
CHLORIDE SERPL-SCNC: 104 MMOL/L (ref 98–107)
CO2 SERPL-SCNC: 26 MMOL/L (ref 21–32)
GLUCOSE SERPL-MCNC: 104 MG/DL (ref 74–106)
HCT VFR BLD AUTO: 31.4 % (ref 36–46)
HGB BLD-MCNC: 10.4 G/DL (ref 12.2–16.2)
INR PPP: 1.02 (ref 0.9–1.15)
MCH RBC QN AUTO: 27.1 PG (ref 28–32)
MCV RBC AUTO: 81.9 FL (ref 80–100)
NRBC BLD QL AUTO: 0.2 %
POTASSIUM SERPL-SCNC: 3.7 MMOL/L (ref 3.5–5.1)
PROT SERPL-MCNC: 6.7 G/DL (ref 6.4–8.2)
SODIUM SERPL-SCNC: 138 MMOL/L (ref 136–145)

## 2023-02-04 RX ADMIN — DEXTROSE AND SODIUM CHLORIDE SCH MLS/HR: 5; 450 INJECTION, SOLUTION INTRAVENOUS at 09:46

## 2023-02-04 RX ADMIN — MORPHINE SULFATE PRN MG: 2 INJECTION, SOLUTION INTRAMUSCULAR; INTRAVENOUS at 21:05

## 2023-02-04 RX ADMIN — SODIUM CHLORIDE SCH MG: 9 INJECTION, SOLUTION INTRAVENOUS at 22:06

## 2023-02-04 RX ADMIN — HYDROCODONE BITARTRATE AND ACETAMINOPHEN PRN TAB: 5; 325 TABLET ORAL at 22:06

## 2023-02-04 RX ADMIN — DEXTROSE AND SODIUM CHLORIDE SCH MLS/HR: 5; 450 INJECTION, SOLUTION INTRAVENOUS at 20:37

## 2023-02-04 RX ADMIN — HYDROCODONE BITARTRATE AND ACETAMINOPHEN PRN TAB: 5; 325 TABLET ORAL at 17:50

## 2023-02-04 RX ADMIN — HYDROCODONE BITARTRATE AND ACETAMINOPHEN PRN TAB: 5; 325 TABLET ORAL at 22:07

## 2023-02-04 RX ADMIN — SODIUM CHLORIDE SCH MG: 9 INJECTION, SOLUTION INTRAVENOUS at 11:00

## 2023-02-04 RX ADMIN — MORPHINE SULFATE PRN MG: 2 INJECTION, SOLUTION INTRAMUSCULAR; INTRAVENOUS at 14:26

## 2023-02-04 RX ADMIN — MAGNESIUM SULFATE IN DEXTROSE SCH MLS/HR: 10 INJECTION, SOLUTION INTRAVENOUS at 11:04

## 2023-02-04 RX ADMIN — MAGNESIUM SULFATE IN DEXTROSE SCH MLS/HR: 10 INJECTION, SOLUTION INTRAVENOUS at 09:56

## 2023-02-05 VITALS — DIASTOLIC BLOOD PRESSURE: 66 MMHG | SYSTOLIC BLOOD PRESSURE: 109 MMHG

## 2023-02-05 VITALS — SYSTOLIC BLOOD PRESSURE: 109 MMHG | DIASTOLIC BLOOD PRESSURE: 72 MMHG

## 2023-02-05 VITALS — DIASTOLIC BLOOD PRESSURE: 72 MMHG | SYSTOLIC BLOOD PRESSURE: 109 MMHG

## 2023-02-05 VITALS — SYSTOLIC BLOOD PRESSURE: 97 MMHG | DIASTOLIC BLOOD PRESSURE: 63 MMHG

## 2023-02-05 VITALS — DIASTOLIC BLOOD PRESSURE: 61 MMHG | SYSTOLIC BLOOD PRESSURE: 93 MMHG

## 2023-02-05 VITALS — SYSTOLIC BLOOD PRESSURE: 85 MMHG | DIASTOLIC BLOOD PRESSURE: 56 MMHG

## 2023-02-05 VITALS — SYSTOLIC BLOOD PRESSURE: 106 MMHG | DIASTOLIC BLOOD PRESSURE: 53 MMHG

## 2023-02-05 LAB
ALBUMIN SERPL-MCNC: 3.5 G/DL (ref 3.4–5)
ALP SERPL-CCNC: 48 U/L (ref 45–117)
ALT SERPL-CCNC: 12 U/L (ref 13–56)
ANION GAP SERPL CALCULATED.3IONS-SCNC: 8 MMOL/L (ref 5–15)
BILIRUB SERPL-MCNC: 0.4 MG/DL (ref 0.2–1)
BUN SERPL-MCNC: 8 MG/DL (ref 7–18)
BUN/CREAT SERPL: 11.4
CALCIUM SERPL-MCNC: 5.9 MG/DL (ref 8.5–10.1)
CHLORIDE SERPL-SCNC: 107 MMOL/L (ref 98–107)
CO2 SERPL-SCNC: 25 MMOL/L (ref 21–32)
GLUCOSE SERPL-MCNC: 96 MG/DL (ref 74–106)
HCT VFR BLD AUTO: 31.6 % (ref 36–46)
HGB BLD-MCNC: 10.4 G/DL (ref 12.2–16.2)
MCH RBC QN AUTO: 27.3 PG (ref 28–32)
MCV RBC AUTO: 82.6 FL (ref 80–100)
NRBC BLD QL AUTO: 0 %
POTASSIUM SERPL-SCNC: 3.8 MMOL/L (ref 3.5–5.1)
PROT SERPL-MCNC: 6.6 G/DL (ref 6.4–8.2)
SODIUM SERPL-SCNC: 140 MMOL/L (ref 136–145)

## 2023-02-05 RX ADMIN — CALCITRIOL SCH MCG: 0.25 CAPSULE, LIQUID FILLED ORAL at 22:15

## 2023-02-05 RX ADMIN — DEXTROSE AND SODIUM CHLORIDE SCH MLS/HR: 5; 450 INJECTION, SOLUTION INTRAVENOUS at 10:20

## 2023-02-05 RX ADMIN — Medication SCH MG: at 13:33

## 2023-02-05 RX ADMIN — MAGNESIUM OXIDE TAB 400 MG (241.3 MG ELEMENTAL MG) SCH MG: 400 (241.3 MG) TAB at 22:15

## 2023-02-05 RX ADMIN — MORPHINE SULFATE PRN MG: 2 INJECTION, SOLUTION INTRAMUSCULAR; INTRAVENOUS at 21:16

## 2023-02-05 RX ADMIN — SODIUM CHLORIDE SCH MG: 9 INJECTION, SOLUTION INTRAVENOUS at 10:20

## 2023-02-05 RX ADMIN — MAGNESIUM SULFATE IN DEXTROSE SCH MLS/HR: 10 INJECTION, SOLUTION INTRAVENOUS at 14:33

## 2023-02-05 RX ADMIN — CEFTRIAXONE SODIUM SCH MLS/HR: 1 INJECTION, POWDER, FOR SOLUTION INTRAMUSCULAR; INTRAVENOUS at 10:20

## 2023-02-05 RX ADMIN — Medication SCH MG: at 18:13

## 2023-02-05 RX ADMIN — CALCITRIOL SCH MCG: 0.25 CAPSULE, LIQUID FILLED ORAL at 14:35

## 2023-02-05 RX ADMIN — MAGNESIUM SULFATE IN DEXTROSE SCH MLS/HR: 10 INJECTION, SOLUTION INTRAVENOUS at 12:56

## 2023-02-05 RX ADMIN — DEXTROSE AND SODIUM CHLORIDE SCH MLS/HR: 5; 450 INJECTION, SOLUTION INTRAVENOUS at 23:30

## 2023-02-05 RX ADMIN — MORPHINE SULFATE PRN MG: 2 INJECTION, SOLUTION INTRAMUSCULAR; INTRAVENOUS at 05:35

## 2023-02-05 RX ADMIN — SODIUM CHLORIDE SCH MG: 9 INJECTION, SOLUTION INTRAVENOUS at 21:12

## 2023-02-05 RX ADMIN — Medication SCH MG: at 22:16

## 2023-02-06 VITALS — DIASTOLIC BLOOD PRESSURE: 64 MMHG | SYSTOLIC BLOOD PRESSURE: 94 MMHG

## 2023-02-06 VITALS — DIASTOLIC BLOOD PRESSURE: 67 MMHG | SYSTOLIC BLOOD PRESSURE: 96 MMHG

## 2023-02-06 VITALS — DIASTOLIC BLOOD PRESSURE: 68 MMHG | SYSTOLIC BLOOD PRESSURE: 93 MMHG

## 2023-02-06 VITALS — SYSTOLIC BLOOD PRESSURE: 93 MMHG | DIASTOLIC BLOOD PRESSURE: 59 MMHG

## 2023-02-06 LAB
ALBUMIN SERPL-MCNC: 3.3 G/DL (ref 3.4–5)
ALP SERPL-CCNC: 47 U/L (ref 45–117)
ALT SERPL-CCNC: 12 U/L (ref 13–56)
ANION GAP SERPL CALCULATED.3IONS-SCNC: 8 MMOL/L (ref 5–15)
ANION GAP SERPL CALCULATED.3IONS-SCNC: 8 MMOL/L (ref 5–15)
BILIRUB SERPL-MCNC: 0.4 MG/DL (ref 0.2–1)
BUN SERPL-MCNC: 6 MG/DL (ref 7–18)
BUN SERPL-MCNC: 7 MG/DL (ref 7–18)
BUN/CREAT SERPL: 10.3
BUN/CREAT SERPL: 8.8
CALCIUM SERPL-MCNC: 5.8 MG/DL (ref 8.5–10.1)
CALCIUM SERPL-MCNC: 6.8 MG/DL (ref 8.5–10.1)
CHLORIDE SERPL-SCNC: 106 MMOL/L (ref 98–107)
CHLORIDE SERPL-SCNC: 109 MMOL/L (ref 98–107)
CO2 SERPL-SCNC: 25 MMOL/L (ref 21–32)
CO2 SERPL-SCNC: 26 MMOL/L (ref 21–32)
GLUCOSE SERPL-MCNC: 103 MG/DL (ref 74–106)
GLUCOSE SERPL-MCNC: 91 MG/DL (ref 74–106)
MAGNESIUM SERPL-MCNC: 1.8 MG/DL (ref 1.6–2.6)
POTASSIUM SERPL-SCNC: 3.2 MMOL/L (ref 3.5–5.1)
POTASSIUM SERPL-SCNC: 3.5 MMOL/L (ref 3.5–5.1)
PROT SERPL-MCNC: 7.3 G/DL (ref 6.4–8.2)
SODIUM SERPL-SCNC: 140 MMOL/L (ref 136–145)
SODIUM SERPL-SCNC: 142 MMOL/L (ref 136–145)

## 2023-02-06 RX ADMIN — Medication SCH MG: at 15:21

## 2023-02-06 RX ADMIN — Medication SCH MG: at 11:30

## 2023-02-06 RX ADMIN — MAGNESIUM OXIDE TAB 400 MG (241.3 MG ELEMENTAL MG) SCH MG: 400 (241.3 MG) TAB at 09:48

## 2023-02-06 RX ADMIN — Medication SCH MG: at 18:17

## 2023-02-06 RX ADMIN — Medication SCH MG: at 12:00

## 2023-02-06 RX ADMIN — Medication SCH MG: at 12:37

## 2023-02-06 RX ADMIN — DEXTROSE AND SODIUM CHLORIDE SCH MLS/HR: 5; 450 INJECTION, SOLUTION INTRAVENOUS at 10:32

## 2023-02-06 RX ADMIN — CALCITRIOL SCH MCG: 0.25 CAPSULE, LIQUID FILLED ORAL at 05:19

## 2023-02-06 RX ADMIN — DEXTROSE AND SODIUM CHLORIDE SCH MLS/HR: 5; 450 INJECTION, SOLUTION INTRAVENOUS at 14:49

## 2023-02-06 RX ADMIN — Medication SCH MG: at 05:19

## 2023-02-06 RX ADMIN — CEFTRIAXONE SODIUM SCH MLS/HR: 1 INJECTION, POWDER, FOR SOLUTION INTRAMUSCULAR; INTRAVENOUS at 09:47

## 2023-02-06 RX ADMIN — SODIUM CHLORIDE SCH MG: 9 INJECTION, SOLUTION INTRAVENOUS at 09:47

## 2023-02-06 RX ADMIN — CALCITRIOL SCH MCG: 0.25 CAPSULE, LIQUID FILLED ORAL at 14:49

## 2023-11-24 NOTE — NUR
Opening Shift Note

Assumed care of patient, awake and alert, sitting on bed, just finished dinner.  Breathing 
on RA, even and nonlabored, No S/S of distress/SOB. C/O pain at the frontal head area then 
radiated to left cheek, maxilar then occiput area 8/10, will administer pain medicine as 
order and continue monitoring. Bed in low position, call light within reach, all alarms are 
audible, fall and safety precaution in place.  Instructed on POC and to call for assist PRN, 
will continue to monitor for changes Q1hr and PRN. min progressing to modA/minimum assist (75% patients effort)/moderate assist (50% patients effort)